# Patient Record
Sex: MALE | Race: WHITE
[De-identification: names, ages, dates, MRNs, and addresses within clinical notes are randomized per-mention and may not be internally consistent; named-entity substitution may affect disease eponyms.]

---

## 2017-06-20 ENCOUNTER — HOSPITAL ENCOUNTER (INPATIENT)
Dept: HOSPITAL 26 - MED | Age: 32
LOS: 2 days | Discharge: HOME | DRG: 433 | End: 2017-06-22
Attending: FAMILY MEDICINE | Admitting: FAMILY MEDICINE
Payer: MEDICAID

## 2017-06-20 VITALS — HEIGHT: 66 IN | BODY MASS INDEX: 29.58 KG/M2 | WEIGHT: 184.04 LBS

## 2017-06-20 VITALS — DIASTOLIC BLOOD PRESSURE: 61 MMHG | SYSTOLIC BLOOD PRESSURE: 129 MMHG

## 2017-06-20 DIAGNOSIS — E02: ICD-10-CM

## 2017-06-20 DIAGNOSIS — K92.0: ICD-10-CM

## 2017-06-20 DIAGNOSIS — D61.818: ICD-10-CM

## 2017-06-20 DIAGNOSIS — Z71.41: ICD-10-CM

## 2017-06-20 DIAGNOSIS — Z56.0: ICD-10-CM

## 2017-06-20 DIAGNOSIS — F10.10: ICD-10-CM
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
## 2017-06-20 DIAGNOSIS — F17.210: ICD-10-CM

## 2017-06-20 DIAGNOSIS — D62: ICD-10-CM

## 2017-06-20 DIAGNOSIS — K21.0: ICD-10-CM

## 2017-06-20 DIAGNOSIS — E44.0: ICD-10-CM

## 2017-06-20 DIAGNOSIS — K70.30: Primary | ICD-10-CM

## 2017-06-20 DIAGNOSIS — K22.10: ICD-10-CM

## 2017-06-20 DIAGNOSIS — D53.9: ICD-10-CM

## 2017-06-20 LAB
ALBUMIN FLD-MCNC: 2.9 G/DL (ref 3.4–5)
ALP SERPL-CCNC: 89 U/L (ref 46–116)
ALT SERPL-CCNC: 63 U/L (ref 12–78)
ANION GAP SERPL CALCULATED.3IONS-SCNC: 12.2 MMOL/L (ref 8–16)
APPEARANCE UR: CLEAR
AST SERPL-CCNC: 92 U/L (ref 15–37)
BILIRUB SERPL-MCNC: 0.5 MG/DL (ref 0–1)
BILIRUB UR QL STRIP: NEGATIVE
BUN SERPL-MCNC: 8 MG/DL (ref 7–18)
CALCIUM SPEC-MCNC: 7.6 MG/DL (ref 8.5–10.1)
CHLORIDE SERPL-SCNC: 108 MMOL/L (ref 98–107)
CO2 SERPL-SCNC: 25.3 MMOL/L (ref 21–32)
COLOR UR: YELLOW
CREAT SERPL-MCNC: 0.8 MG/DL (ref 0.6–1.3)
ERYTHROCYTE [DISTWIDTH] IN BLOOD BY AUTOMATED COUNT: 15.6 % (ref 11.6–13.7)
GFR SERPL CREATININE-BSD FRML MDRD: 143 ML/MIN (ref 90–?)
GLUCOSE SERPL-MCNC: 99 MG/DL (ref 74–106)
GLUCOSE UR STRIP-MCNC: NEGATIVE MG/DL
HCT VFR BLD AUTO: 28.5 % (ref 36–52)
HGB BLD-MCNC: 9.5 G/DL (ref 12–18)
HGB UR QL STRIP: NEGATIVE
HYPOCHROMIA BLD QL: (no result)
LEUKOCYTE ESTERASE UR QL STRIP: NEGATIVE
MACROCYTES BLD QL: (no result)
MCH RBC QN AUTO: 34 PG (ref 27–31)
MCHC RBC AUTO-ENTMCNC: 34 G/DL (ref 33–37)
MCV RBC AUTO: 101 FL (ref 80–94)
NEUTROPHILS % (MANUAL): 31 (ref 43–65)
NITRITE UR QL STRIP: NEGATIVE
PH UR STRIP: 6 [PH] (ref 5–9)
PLATELET # BLD AUTO: 71 K/UL (ref 140–450)
PLATELET BLD QL SMEAR: (no result)
POTASSIUM SERPL-SCNC: 3.5 MMOL/L (ref 3.5–5.1)
PROT SERPL-MCNC: 7 G/DL (ref 6.4–8.2)
PROT UR QL STRIP: NEGATIVE
RBC # BLD AUTO: 2.83 MIL/UL (ref 4.2–6.1)
SODIUM SERPL-SCNC: 142 MMOL/L (ref 136–145)
SP GR UR STRIP: <=1.005 (ref 1–1.03)
UROBILINOGEN UR STRIP-MCNC: 0.2 EU/DL (ref 0.2–1)
WBC # BLD AUTO: 6.4 K/UL (ref 4.8–10.8)

## 2017-06-20 PROCEDURE — C9113 INJ PANTOPRAZOLE SODIUM, VIA: HCPCS

## 2017-06-20 PROCEDURE — G0482 DRUG TEST DEF 15-21 CLASSES: HCPCS

## 2017-06-20 SDOH — ECONOMIC STABILITY - INCOME SECURITY: UNEMPLOYMENT, UNSPECIFIED: Z56.0

## 2017-06-20 NOTE — NUR
32Y/M PATIENT PRESENTS TO ED WITH C/O BLE SWEELING X 2 DAYS . PT STATES BLE 
PAIN AND SWELLING X 2 DAY. NO MEDICAL HX; SKIN IS PINK/WARM/DRY; AAOX4 WITH 
EVEN AND STEADY GAIT; LUNGS CLEAR BL; HR EVEN AND REGULAR; PT DENIES ANY FEVER, 
CP, SOB, OR COUGH AT THIS TIME; PATIENT STATES PAIN OF 0/10 AT THIS TIME; VSS; 
PATIENT POSITIONED FOR COMFORT; HOB ELEVATED; BEDRAILS UP X2; BED DOWN. ER MD 
MADE AWARE OF PT STATUS.

## 2017-06-21 VITALS — DIASTOLIC BLOOD PRESSURE: 70 MMHG | SYSTOLIC BLOOD PRESSURE: 131 MMHG

## 2017-06-21 VITALS — DIASTOLIC BLOOD PRESSURE: 74 MMHG | SYSTOLIC BLOOD PRESSURE: 125 MMHG

## 2017-06-21 VITALS — SYSTOLIC BLOOD PRESSURE: 125 MMHG | DIASTOLIC BLOOD PRESSURE: 71 MMHG

## 2017-06-21 VITALS — DIASTOLIC BLOOD PRESSURE: 73 MMHG | SYSTOLIC BLOOD PRESSURE: 125 MMHG

## 2017-06-21 VITALS — SYSTOLIC BLOOD PRESSURE: 130 MMHG | DIASTOLIC BLOOD PRESSURE: 64 MMHG

## 2017-06-21 VITALS — DIASTOLIC BLOOD PRESSURE: 75 MMHG | SYSTOLIC BLOOD PRESSURE: 123 MMHG

## 2017-06-21 LAB
AMYLASE SERPL-CCNC: 107 U/L (ref 25–115)
AMYLASE SERPL-CCNC: 124 U/L (ref 25–115)
APTT PPP: 33 SECS (ref 22–35.6)
CHOLEST SERPL-MCNC: 147 MG/DL (ref ?–200)
CHOLEST/HDLC SERPL: 14.7 {RATIO} (ref 1–4.5)
EOSINOPHIL NFR BLD MANUAL: 1 % (ref 0–4)
ERYTHROCYTE [DISTWIDTH] IN BLOOD BY AUTOMATED COUNT: 15.7 % (ref 11.6–13.7)
HCT VFR BLD AUTO: 24.5 % (ref 36–52)
HDLC SERPL-MCNC: 10 MG/DL (ref 40–60)
HGB BLD-MCNC: 8.2 G/DL (ref 12–18)
INR PPP: 1.5 (ref 0.8–1.2)
LDLC SERPL CALC-MCNC: 94 MG/DL (ref 60–100)
LIPASE SERPL-CCNC: 249 U/L (ref 73–393)
LIPASE SERPL-CCNC: 321 U/L (ref 73–393)
LYMPHOCYTES NFR BLD MANUAL: 59 % (ref 20–46)
LYMPHOCYTES NFR BLD MANUAL: 64 % (ref 20–46)
MACROCYTES BLD QL: (no result)
MAGNESIUM SERPL-MCNC: 1.9 MG/DL (ref 1.8–2.4)
MCH RBC QN AUTO: 34 PG (ref 27–31)
MCHC RBC AUTO-ENTMCNC: 33 G/DL (ref 33–37)
MCV RBC AUTO: 101 FL (ref 80–94)
MONOCYTES NFR BLD MANUAL: 4 % (ref 5–12)
MONOCYTES NFR BLD MANUAL: 7 % (ref 5–12)
NEUTROPHILS % (MANUAL): 29 (ref 43–65)
NEUTS BAND NFR BLD MANUAL: 4 % (ref 0–8)
PHOSPHATE SERPL-MCNC: 4.3 MG/DL (ref 2.5–4.9)
PLATELET # BLD AUTO: 51 K/UL (ref 140–450)
PLATELET BLD QL SMEAR: (no result)
PROTHROMBIN TIME: 15.6 SECS (ref 10.8–13.4)
RBC # BLD AUTO: 2.42 MIL/UL (ref 4.2–6.1)
T4 FREE SERPL-MCNC: 0.85 NG/DL (ref 0.76–1.46)
TRIGL SERPL-MCNC: 219 MG/DL (ref 30–150)
TSH SERPL DL<=0.05 MIU/L-ACNC: 5.97 UIU/ML (ref 0.34–3.76)
WBC # BLD AUTO: 4.3 K/UL (ref 4.8–10.8)

## 2017-06-21 PROCEDURE — 0DB68ZX EXCISION OF STOMACH, VIA NATURAL OR ARTIFICIAL OPENING ENDOSCOPIC, DIAGNOSTIC: ICD-10-PCS

## 2017-06-21 RX ADMIN — FERROUS SULFATE TAB 325 MG (65 MG ELEMENTAL FE) SCH MG: 325 (65 FE) TAB at 20:14

## 2017-06-21 RX ADMIN — MORPHINE SULFATE PRN MG: 2 INJECTION, SOLUTION INTRAMUSCULAR; INTRAVENOUS at 10:56

## 2017-06-21 RX ADMIN — PANTOPRAZOLE SODIUM SCH MG: 40 INJECTION, POWDER, FOR SOLUTION INTRAVENOUS at 20:24

## 2017-06-21 RX ADMIN — PANTOPRAZOLE SODIUM SCH MG: 40 INJECTION, POWDER, FOR SOLUTION INTRAVENOUS at 09:26

## 2017-06-21 RX ADMIN — LACTULOSE SCH GM: 10 SOLUTION ORAL at 20:17

## 2017-06-21 RX ADMIN — MORPHINE SULFATE PRN MG: 2 INJECTION, SOLUTION INTRAMUSCULAR; INTRAVENOUS at 20:16

## 2017-06-21 RX ADMIN — PANTOPRAZOLE SODIUM SCH MG: 40 INJECTION, POWDER, FOR SOLUTION INTRAVENOUS at 20:15

## 2017-06-21 NOTE — NUR
[id number]. NOTED PT. UNDERSTANDS ENGLISH . SPEAKS Mongolian WITH WIFE.  
WIFE SPEAKS Mongolian AND ENGLISH.

## 2017-06-21 NOTE — NUR
RECEIVED FROM ER PER KENYA AWAKE AND ALERT. NO SOB. DX. OF LIVER CIRRHOSIS AND GI BLEED. 
VERBALIZES WELL IN Turkmen. ACCOMPANIED BY FAMILY MEMBER. IVF SITE TO [facility]. WITH BILATERAL 
NON-PITTING EDEMA. CALL LIGHT USE EXPLAINED. CARE PLANS FOR THE NIGHT EXPLAINED TO FAMILY 
MEMBER. WITH LEFT UPPER QUADRANT TENDERNESS DURING PALPATION.

## 2017-06-21 NOTE — NUR
RECEIVED REPORT FROM NIGHT NURSE, PT IS AAOX4 [place] SPEAKING, ON ROOM AIR, SKIN INTACT, 
WITH NON-PITTING EDEMA TO LOWER EXTREMITIES. IV TO RIGHT AC 20G SALINE LOCK PATENT AND 
INTACT, LEFT FA 18G SALINE LOCK PATENT AND INTACT. INITIAL ASSESSMENT COMPLETED, REVIEW PLAN 
OF CARE WITH PT PT VERBALIZED UNDERSTANDING, ALL SAFETY PRECAUTION MET. ALL NEEDS MET. CALL 
LIGHT WITHIN REACH. WILL CONTINUE TO MONITOR.

## 2017-06-21 NOTE — NUR
RECEIVED REPORT FROM DAY RN FOR CONTINUITY OF CARE. PATIENT IS A&OX4, DISCUSSED PLAN OF CARE 
WITH PATIENT, VERBALIZED UNDERSTANDING. SHIFT ASSESSMENT DONE, VS TAKEN, STABLE AT THIS 
TIME. NO S/S OF RESPIRATORY DISTRESS OR DISCOMFORT NOTED ON ROOM AIR. PATIENT STATES 
ABDOMINAL PAIN, WILL MEDICATE PER MD ORDER. IV TO RT AC AND LT FA PATENT AND FLUSHED. SAFETY 
PRECAUTIONS ENFORCED. CALL LIGHT WITHIN REACH. WILL CONTINUE TO MONITOR.

## 2017-06-21 NOTE — NUR
DUE MEDICATIONS ADMINISTERED, TOLERATED WELL AND VERBALIZED UNDERSTANDING OF USE. CALL LIGHT 
WITHIN REACH. WILL CONTINUE TO MONITOR.

## 2017-06-21 NOTE — NUR
PATIENT HAS BEEN SCREENED AND CATEGORIZED AS MODERATE NUTRITION RISK. PATIENT WILL BE SEEN 
WITHIN 3-5 DAYS OF ADMISSION.



6/23/17-6/25/17



JAYCEE VEGA RD

## 2017-06-21 NOTE — NUR
PT. SLEPT WELL THIS SHIFT. REFUSED TO HAVE SEQUENTIALS IN PLACE. PROS AND CONS EXPLAINED.  
NO COMPLAINTS OF PAIN DONE THIS SHIFT. TELEMETRY MONITORING.

## 2017-06-21 NOTE — NUR
Patient will be admitted to care of DR. GONZALES. Admited to TELEMETRY.  Will go 
to room 111B. Belongings list completed.  Report to MATT ESPINO.

## 2017-06-21 NOTE — NUR
PT CURRENTLY SITTING ON CHAIR HAVING DINNER. NO S/S OF DISTRESS NOTED. ALL NEEDS MET, WIFE 
AT BEDSIDE. WILL CONTINUE TO MONITOR.

## 2017-06-21 NOTE — NUR
DUE MEDICATION GIVEN. ALL NEEDS MET. CALL LIGHT WITHIN REACH. WIFE AT BEDSIDE. WILL CONTINUE 
TO MONITOR.

## 2017-06-22 VITALS — SYSTOLIC BLOOD PRESSURE: 119 MMHG | DIASTOLIC BLOOD PRESSURE: 71 MMHG

## 2017-06-22 VITALS — SYSTOLIC BLOOD PRESSURE: 130 MMHG | DIASTOLIC BLOOD PRESSURE: 78 MMHG

## 2017-06-22 VITALS — DIASTOLIC BLOOD PRESSURE: 72 MMHG | SYSTOLIC BLOOD PRESSURE: 118 MMHG

## 2017-06-22 VITALS — DIASTOLIC BLOOD PRESSURE: 69 MMHG | SYSTOLIC BLOOD PRESSURE: 113 MMHG

## 2017-06-22 LAB
ANION GAP SERPL CALCULATED.3IONS-SCNC: 10.6 MMOL/L (ref 8–16)
BUN SERPL-MCNC: 9 MG/DL (ref 7–18)
CALCIUM SPEC-MCNC: 7.8 MG/DL (ref 8.5–10.1)
CHLORIDE SERPL-SCNC: 106 MMOL/L (ref 98–107)
CO2 SERPL-SCNC: 26.1 MMOL/L (ref 21–32)
CREAT SERPL-MCNC: 0.7 MG/DL (ref 0.6–1.3)
EOSINOPHIL NFR BLD MANUAL: 1 % (ref 0–4)
ERYTHROCYTE [DISTWIDTH] IN BLOOD BY AUTOMATED COUNT: 15 % (ref 11.6–13.7)
GFR SERPL CREATININE-BSD FRML MDRD: 167 ML/MIN (ref 90–?)
GLUCOSE SERPL-MCNC: 89 MG/DL (ref 74–106)
HCT VFR BLD AUTO: 26.3 % (ref 36–52)
HCV AB S/CO SERPL IA: <0.1 S/CO RATIO (ref 0–0.9)
HGB BLD-MCNC: 8.8 G/DL (ref 12–18)
LYMPHOCYTES NFR BLD MANUAL: 56 % (ref 20–46)
MAGNESIUM SERPL-MCNC: 1.8 MG/DL (ref 1.8–2.4)
MCH RBC QN AUTO: 34 PG (ref 27–31)
MCHC RBC AUTO-ENTMCNC: 34 G/DL (ref 33–37)
MCV RBC AUTO: 100 FL (ref 80–94)
MONOCYTES NFR BLD MANUAL: 5 % (ref 5–12)
NEUTROPHILS % (MANUAL): 38 (ref 43–65)
PHOSPHATE SERPL-MCNC: 3.4 MG/DL (ref 2.5–4.9)
PLATELET # BLD AUTO: 57 K/UL (ref 140–450)
POTASSIUM SERPL-SCNC: 3.7 MMOL/L (ref 3.5–5.1)
RBC # BLD AUTO: 2.62 MIL/UL (ref 4.2–6.1)
SODIUM SERPL-SCNC: 139 MMOL/L (ref 136–145)
T3RU NFR SERPL: 35 % (ref 24–39)
T4 SERPL-MCNC: 4.3 UG/DL (ref 4.5–12)
WBC # BLD AUTO: 3.4 K/UL (ref 4.8–10.8)

## 2017-06-22 RX ADMIN — FERROUS SULFATE TAB 325 MG (65 MG ELEMENTAL FE) SCH MG: 325 (65 FE) TAB at 12:19

## 2017-06-22 RX ADMIN — PANTOPRAZOLE SODIUM SCH MG: 40 INJECTION, POWDER, FOR SOLUTION INTRAVENOUS at 09:08

## 2017-06-22 RX ADMIN — LACTULOSE SCH GM: 10 SOLUTION ORAL at 09:07

## 2017-06-22 RX ADMIN — FERROUS SULFATE TAB 325 MG (65 MG ELEMENTAL FE) SCH MG: 325 (65 FE) TAB at 09:09

## 2017-06-22 NOTE — NUR
VITAL SIGNS STABLE. PATIENT RESTING IN BED, NO S/S OF DISTRESS OR DISCOMFORT NOTED. CALL 
LIGHT WITHIN REACH.

## 2017-06-22 NOTE — NUR
PATIENT SIGNED ALL DISCHARGE PAPERWORK, DISCHARGE EDUCATION GIVEN, FOLLOW UP INFORMATION 
GIVEN, PRESCRIPTION GIVEN, PT VERBALIZED UNDERSTANDING. ALL PERSONAL BELONGING WITH PT. WIFE 
IN TO  PT. PT WAS WALKED TO FRONT LOBBY IN STABLE CONDITION.

## 2017-06-22 NOTE — NUR
VITAL SIGNS STABLE AT THIS TIME, PATIENT DENIES PAIN. CALL LIGHT WITHIN REACH, WILL CONTINUE 
TO MONITOR.

## 2017-06-22 NOTE — NUR
DUE  MEDICATION GIVEN, PT TOLERATED WELL. ALL NEEDS MET. FAMILY AT BEDSIDE. ALL NEEDS MET. 
CALL LIGHT WITHIN REACH. WILL CONTINUE TO MONITOR.

## 2017-06-22 NOTE — NUR
DUE MEDICATIONS GIVEN. PT CURRENTLY EATING LUNCH, FATHER AT BEDSIDE. ALL NEEDS MET. CALL 
LIGHT WITHIN REACH. WILL CONTINUE TO MONITOR.

## 2017-06-22 NOTE — NUR
RECEIVED REPORT FROM NIGHT NURSE, PT IS AAOX4 Turkish SPEAKING, ON ROOM AIR, SKIN INTACT, 
WITH NON-PITTING EDEMA TO LOWER EXTREMITIES. IV TO RIGHT AC 20G SALINE LOCK PATENT AND 
INTACT, LEFT FA 22G SALINE LOCK PATENT AND INTACT. INITIAL ASSESSMENT COMPLETED, REVIEW PLAN 
OF CARE WITH PT PT VERBALIZED UNDERSTANDING, ALL SAFETY PRECAUTION MET. ALL NEEDS MET. CALL 
LIGHT WITHIN REACH. WILL CONTINUE TO MONITOR.

## 2017-06-22 NOTE — NUR
PATIENT ASLEEP AT THIS TIME. NO S/S OF DISTRESS OR DISCOMFORT NOTED. CALL LIGHT WITHIN 
REACH, WILL CONTINUE TO MONITOR.

## 2017-06-23 LAB — HBA1C MFR BLD: 4.8 % (ref 4.8–5.6)

## 2017-09-27 ENCOUNTER — HOSPITAL ENCOUNTER (EMERGENCY)
Dept: HOSPITAL 26 - MED | Age: 32
Discharge: HOME | End: 2017-09-27
Payer: MEDICAID

## 2017-09-27 VITALS — SYSTOLIC BLOOD PRESSURE: 139 MMHG | DIASTOLIC BLOOD PRESSURE: 75 MMHG

## 2017-09-27 VITALS — SYSTOLIC BLOOD PRESSURE: 122 MMHG | DIASTOLIC BLOOD PRESSURE: 70 MMHG

## 2017-09-27 VITALS — WEIGHT: 185 LBS | HEIGHT: 67 IN | BODY MASS INDEX: 29.03 KG/M2

## 2017-09-27 DIAGNOSIS — G92: Primary | ICD-10-CM

## 2017-09-27 DIAGNOSIS — Z79.899: ICD-10-CM

## 2017-09-27 DIAGNOSIS — F10.229: ICD-10-CM

## 2017-09-27 LAB
ALBUMIN FLD-MCNC: 3.6 G/DL (ref 3.4–5)
ANION GAP SERPL CALCULATED.3IONS-SCNC: 13.9 MMOL/L (ref 8–16)
AST SERPL-CCNC: 94 U/L (ref 15–37)
BASOPHILS # BLD AUTO: 0.5 K/UL (ref 0–0.22)
BASOPHILS NFR BLD AUTO: 0 % (ref 0–2)
BILIRUB DIRECT SERPL-MCNC: 0.2 MG/DL (ref 0–0.3)
BILIRUB SERPL-MCNC: 0.6 MG/DL (ref 0–1)
BUN SERPL-MCNC: 11 MG/DL (ref 7–18)
CHLORIDE SERPL-SCNC: 107 MMOL/L (ref 98–107)
CO2 SERPL-SCNC: 22.8 MMOL/L (ref 21–32)
CREAT SERPL-MCNC: 0.7 MG/DL (ref 0.7–1.3)
EOSINOPHIL # BLD AUTO: 0.1 K/UL (ref 0–0.4)
EOSINOPHIL NFR BLD AUTO: 1 % (ref 0–4)
ERYTHROCYTE [DISTWIDTH] IN BLOOD BY AUTOMATED COUNT: 17.9 % (ref 11.6–13.7)
GFR SERPL CREATININE-BSD FRML MDRD: 167 ML/MIN (ref 90–?)
GLUCOSE SERPL-MCNC: 105 MG/DL (ref 74–106)
HCT VFR BLD AUTO: 38 % (ref 36–52)
HGB BLD-MCNC: 12.7 G/DL (ref 12–18)
LIPASE SERPL-CCNC: 253 U/L (ref 73–393)
LYMPHOCYTES # BLD AUTO: 2.8 K/UL (ref 2–11.5)
LYMPHOCYTES NFR BLD AUTO: 48 % (ref 20.5–51.1)
MCH RBC QN AUTO: 25 PG (ref 27–31)
MCHC RBC AUTO-ENTMCNC: 33 G/DL (ref 33–37)
MCV RBC AUTO: 76 FL (ref 80–94)
MONOCYTES # BLD AUTO: 0.3 K/UL (ref 0.8–1)
MONOCYTES NFR BLD AUTO: 5 % (ref 1.7–9.3)
NEUTROPHILS # BLD AUTO: 2.3 K/UL (ref 1.8–7.7)
NEUTROPHILS NFR BLD AUTO: 46 % (ref 42.2–75.2)
PLATELET # BLD AUTO: 74 K/UL (ref 140–450)
POTASSIUM SERPL-SCNC: 3.7 MMOL/L (ref 3.5–5.1)
PROTHROMBIN TIME: 13.6 SECS (ref 10.8–13.4)
RBC # BLD AUTO: 4.99 MIL/UL (ref 4.2–6.1)
SODIUM SERPL-SCNC: 140 MMOL/L (ref 136–145)
WBC # BLD AUTO: 6 K/UL (ref 4.8–10.8)

## 2017-09-27 PROCEDURE — 36415 COLL VENOUS BLD VENIPUNCTURE: CPT

## 2017-09-27 PROCEDURE — A9153 MULTI-VITAMIN NOS: HCPCS

## 2017-09-27 PROCEDURE — 96375 TX/PRO/DX INJ NEW DRUG ADDON: CPT

## 2017-09-27 PROCEDURE — 80053 COMPREHEN METABOLIC PANEL: CPT

## 2017-09-27 PROCEDURE — 96365 THER/PROPH/DIAG IV INF INIT: CPT

## 2017-09-27 PROCEDURE — 85025 COMPLETE CBC W/AUTO DIFF WBC: CPT

## 2017-09-27 PROCEDURE — 85730 THROMBOPLASTIN TIME PARTIAL: CPT

## 2017-09-27 PROCEDURE — 83690 ASSAY OF LIPASE: CPT

## 2017-09-27 PROCEDURE — 82248 BILIRUBIN DIRECT: CPT

## 2017-09-27 PROCEDURE — 85610 PROTHROMBIN TIME: CPT

## 2017-09-27 PROCEDURE — 99284 EMERGENCY DEPT VISIT MOD MDM: CPT

## 2017-09-27 NOTE — NUR
Patient discharged with v/s stable. Written and verbal after care instructions 
given and explained. Patient alert, oriented and verbalized understanding of 
instructions. Ambulatory with steady gait. All questions addressed prior to 
discharge. ID band removed. Patient advised to follow up with PMD. Rx of ATIVAN 
1 MG & NORCO 5MG-325MG given. Patient educated on indication of medication 
including possible reaction and side effects. Opportunity to ask questions 
provided and answered.

## 2018-11-02 ENCOUNTER — HOSPITAL ENCOUNTER (EMERGENCY)
Dept: HOSPITAL 26 - MED | Age: 33
Discharge: HOME | End: 2018-11-02
Payer: MEDICAID

## 2018-11-02 VITALS — DIASTOLIC BLOOD PRESSURE: 79 MMHG | SYSTOLIC BLOOD PRESSURE: 125 MMHG

## 2018-11-02 VITALS — DIASTOLIC BLOOD PRESSURE: 80 MMHG | SYSTOLIC BLOOD PRESSURE: 130 MMHG

## 2018-11-02 VITALS — HEIGHT: 66 IN | WEIGHT: 185 LBS | BODY MASS INDEX: 29.73 KG/M2

## 2018-11-02 DIAGNOSIS — Z88.8: ICD-10-CM

## 2018-11-02 DIAGNOSIS — K52.9: Primary | ICD-10-CM

## 2018-11-02 DIAGNOSIS — K74.60: ICD-10-CM

## 2018-11-02 DIAGNOSIS — Z79.899: ICD-10-CM

## 2018-11-02 LAB
ALBUMIN FLD-MCNC: 4 G/DL (ref 3.4–5)
ANION GAP SERPL CALCULATED.3IONS-SCNC: 15.2 MMOL/L (ref 8–16)
AST SERPL-CCNC: 99 U/L (ref 15–37)
BASOPHILS # BLD AUTO: 0 K/UL (ref 0–0.22)
BASOPHILS NFR BLD AUTO: 0.4 % (ref 0–2)
BILIRUB SERPL-MCNC: 0.3 MG/DL (ref 0–1)
BUN SERPL-MCNC: 9 MG/DL (ref 7–18)
CHLORIDE SERPL-SCNC: 105 MMOL/L (ref 98–107)
CO2 SERPL-SCNC: 26.5 MMOL/L (ref 21–32)
CREAT SERPL-MCNC: 0.9 MG/DL (ref 0.7–1.3)
EOSINOPHIL # BLD AUTO: 0 K/UL (ref 0–0.4)
EOSINOPHIL NFR BLD AUTO: 0.6 % (ref 0–4)
ERYTHROCYTE [DISTWIDTH] IN BLOOD BY AUTOMATED COUNT: 18.1 % (ref 11.6–13.7)
GFR SERPL CREATININE-BSD FRML MDRD: 124 ML/MIN (ref 90–?)
GLUCOSE SERPL-MCNC: 95 MG/DL (ref 74–106)
HCT VFR BLD AUTO: 44.9 % (ref 36–52)
HGB BLD-MCNC: 14.8 G/DL (ref 12–18)
LYMPHOCYTES # BLD AUTO: 3.3 K/UL (ref 2–11.5)
LYMPHOCYTES NFR BLD AUTO: 61.6 % (ref 20.5–51.1)
MCH RBC QN AUTO: 28 PG (ref 27–31)
MCHC RBC AUTO-ENTMCNC: 33 G/DL (ref 33–37)
MCV RBC AUTO: 86.1 FL (ref 80–94)
MONOCYTES # BLD AUTO: 0.4 K/UL (ref 0.8–1)
MONOCYTES NFR BLD AUTO: 6.9 % (ref 1.7–9.3)
NEUTROPHILS # BLD AUTO: 1.7 K/UL (ref 1.8–7.7)
NEUTROPHILS NFR BLD AUTO: 30.5 % (ref 42.2–75.2)
PLATELET # BLD AUTO: 83 K/UL (ref 140–450)
POTASSIUM SERPL-SCNC: 3.7 MMOL/L (ref 3.5–5.1)
RBC # BLD AUTO: 5.21 MIL/UL (ref 4.2–6.1)
SODIUM SERPL-SCNC: 143 MMOL/L (ref 136–145)
WBC # BLD AUTO: 5.4 K/UL (ref 4.8–10.8)

## 2018-11-02 PROCEDURE — 80053 COMPREHEN METABOLIC PANEL: CPT

## 2018-11-02 PROCEDURE — 96361 HYDRATE IV INFUSION ADD-ON: CPT

## 2018-11-02 PROCEDURE — 85025 COMPLETE CBC W/AUTO DIFF WBC: CPT

## 2018-11-02 PROCEDURE — 99285 EMERGENCY DEPT VISIT HI MDM: CPT

## 2018-11-02 PROCEDURE — 96374 THER/PROPH/DIAG INJ IV PUSH: CPT

## 2018-11-02 PROCEDURE — 36415 COLL VENOUS BLD VENIPUNCTURE: CPT

## 2018-11-02 PROCEDURE — 74022 RADEX COMPL AQT ABD SERIES: CPT

## 2018-12-09 ENCOUNTER — HOSPITAL ENCOUNTER (EMERGENCY)
Dept: HOSPITAL 26 - MED | Age: 33
Discharge: HOME | End: 2018-12-09
Payer: MEDICAID

## 2018-12-09 VITALS — DIASTOLIC BLOOD PRESSURE: 83 MMHG | SYSTOLIC BLOOD PRESSURE: 120 MMHG

## 2018-12-09 VITALS — WEIGHT: 185 LBS | BODY MASS INDEX: 29.03 KG/M2 | HEIGHT: 67 IN

## 2018-12-09 VITALS — DIASTOLIC BLOOD PRESSURE: 80 MMHG | SYSTOLIC BLOOD PRESSURE: 119 MMHG

## 2018-12-09 DIAGNOSIS — R10.9: Primary | ICD-10-CM

## 2018-12-09 DIAGNOSIS — Z88.6: ICD-10-CM

## 2018-12-09 DIAGNOSIS — R11.2: ICD-10-CM

## 2018-12-09 DIAGNOSIS — Z79.899: ICD-10-CM

## 2018-12-09 LAB
ALBUMIN FLD-MCNC: 3.8 G/DL (ref 3.4–5)
ANION GAP SERPL CALCULATED.3IONS-SCNC: 12.7 MMOL/L (ref 8–16)
AST SERPL-CCNC: 104 U/L (ref 15–37)
BASOPHILS # BLD AUTO: 0 K/UL (ref 0–0.22)
BASOPHILS NFR BLD AUTO: 0.6 % (ref 0–2)
BILIRUB SERPL-MCNC: 0.3 MG/DL (ref 0–1)
BUN SERPL-MCNC: 10 MG/DL (ref 7–18)
CHLORIDE SERPL-SCNC: 106 MMOL/L (ref 98–107)
CO2 SERPL-SCNC: 27.3 MMOL/L (ref 21–32)
CREAT SERPL-MCNC: 0.9 MG/DL (ref 0.7–1.3)
EOSINOPHIL # BLD AUTO: 0.1 K/UL (ref 0–0.4)
EOSINOPHIL NFR BLD AUTO: 1.1 % (ref 0–4)
ERYTHROCYTE [DISTWIDTH] IN BLOOD BY AUTOMATED COUNT: 19.2 % (ref 11.6–13.7)
GFR SERPL CREATININE-BSD FRML MDRD: 124 ML/MIN (ref 90–?)
GLUCOSE SERPL-MCNC: 110 MG/DL (ref 74–106)
HCT VFR BLD AUTO: 46.5 % (ref 36–52)
HGB BLD-MCNC: 15.5 G/DL (ref 12–18)
LIPASE SERPL-CCNC: 142 U/L (ref 73–393)
LYMPHOCYTES # BLD AUTO: 4.1 K/UL (ref 2–11.5)
LYMPHOCYTES NFR BLD AUTO: 55 % (ref 20.5–51.1)
MCH RBC QN AUTO: 29 PG (ref 27–31)
MCHC RBC AUTO-ENTMCNC: 33 G/DL (ref 33–37)
MCV RBC AUTO: 86.6 FL (ref 80–94)
MONOCYTES # BLD AUTO: 0.7 K/UL (ref 0.8–1)
MONOCYTES NFR BLD AUTO: 9.4 % (ref 1.7–9.3)
NEUTROPHILS # BLD AUTO: 2.5 K/UL (ref 1.8–7.7)
NEUTROPHILS NFR BLD AUTO: 33.9 % (ref 42.2–75.2)
PLATELET # BLD AUTO: 111 K/UL (ref 140–450)
POTASSIUM SERPL-SCNC: 4 MMOL/L (ref 3.5–5.1)
RBC # BLD AUTO: 5.37 MIL/UL (ref 4.2–6.1)
SODIUM SERPL-SCNC: 142 MMOL/L (ref 136–145)
WBC # BLD AUTO: 7.5 K/UL (ref 4.8–10.8)

## 2018-12-09 PROCEDURE — 96374 THER/PROPH/DIAG INJ IV PUSH: CPT

## 2018-12-09 PROCEDURE — 81002 URINALYSIS NONAUTO W/O SCOPE: CPT

## 2018-12-09 PROCEDURE — 83690 ASSAY OF LIPASE: CPT

## 2018-12-09 PROCEDURE — 80053 COMPREHEN METABOLIC PANEL: CPT

## 2018-12-09 PROCEDURE — 36415 COLL VENOUS BLD VENIPUNCTURE: CPT

## 2018-12-09 PROCEDURE — 74176 CT ABD & PELVIS W/O CONTRAST: CPT

## 2018-12-09 PROCEDURE — 96375 TX/PRO/DX INJ NEW DRUG ADDON: CPT

## 2018-12-09 PROCEDURE — 99284 EMERGENCY DEPT VISIT MOD MDM: CPT

## 2018-12-09 PROCEDURE — 85025 COMPLETE CBC W/AUTO DIFF WBC: CPT

## 2018-12-09 NOTE — NUR
Patient discharged with v/s stable. Written and verbal after care instructions 
given and explained. Patient alert, oriented and verbalized understanding of 
instructions. Ambulatory with steady gait. All questions addressed prior to 
discharge. ID band removed. Patient advised to follow up with PMD. Rx of zofran 
and tramadol given. Patient educated on indication of medication including 
possible reaction and side effects. Opportunity to ask questions provided and 
answered.

## 2018-12-09 NOTE — NUR
33Y/M BIB WIFE WITH C/O SORIANO, BL FLANK PAIN WITH NAUSEA X 2 DAYS WORSE TODAY; 
DENIES V/D. PT IS AAOX4, VSS AT THIS TIME, BED DOWN, BEDRAIL UP X 1 , ER MD 
AWARE AND NOTIFIED OF PT STATUS.



HX; DENIES

RX; DENIES

## 2019-01-28 ENCOUNTER — HOSPITAL ENCOUNTER (EMERGENCY)
Dept: HOSPITAL 26 - MED | Age: 34
LOS: 1 days | Discharge: HOME | End: 2019-01-29
Payer: MEDICAID

## 2019-01-28 VITALS — BODY MASS INDEX: 29.82 KG/M2 | HEIGHT: 67 IN | WEIGHT: 190 LBS

## 2019-01-28 VITALS — SYSTOLIC BLOOD PRESSURE: 131 MMHG | DIASTOLIC BLOOD PRESSURE: 87 MMHG
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
## 2019-01-28 DIAGNOSIS — S42.192A: Primary | ICD-10-CM
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
## 2019-01-28 DIAGNOSIS — Z79.899: ICD-10-CM

## 2019-01-28 DIAGNOSIS — Z88.5: ICD-10-CM

## 2019-01-28 PROCEDURE — 96372 THER/PROPH/DIAG INJ SC/IM: CPT

## 2019-01-28 PROCEDURE — 99283 EMERGENCY DEPT VISIT LOW MDM: CPT

## 2019-01-28 PROCEDURE — 73030 X-RAY EXAM OF SHOULDER: CPT

## 2019-01-28 NOTE — NUR
33/M PRESENTS TO ED WITH FAMILY/FRIEND, C/O L SHOULDER PAIN, X3 DAYS. PT STATED 
THAT HE WAS BINGE DRINKING 3 DAYS AGO AND HAD A FALL "INTO THE DRESSER." 
REPORTS TAKING TRAMADOL AND TYLENOL WITH NO RELIEF. L SHOULDER SKIN INTACT, 
MILD BRUISING NOTED ON ANTERIOR AND POSTERIOR SHOULDER, MILD SWELLING, UNABLE 
TO ABDUCT ARM FULLY DUE TO PAIN. +CIRCULATION, +SENSATION. 

DENIES MED HX OR RX.

## 2019-01-29 ENCOUNTER — HOSPITAL ENCOUNTER (EMERGENCY)
Dept: HOSPITAL 26 - MED | Age: 34
Discharge: HOME | End: 2019-01-29
Payer: MEDICAID

## 2019-01-29 VITALS — SYSTOLIC BLOOD PRESSURE: 126 MMHG | DIASTOLIC BLOOD PRESSURE: 82 MMHG

## 2019-01-29 VITALS — SYSTOLIC BLOOD PRESSURE: 118 MMHG | DIASTOLIC BLOOD PRESSURE: 72 MMHG

## 2019-01-29 VITALS — WEIGHT: 180 LBS | HEIGHT: 65 IN | BODY MASS INDEX: 29.99 KG/M2

## 2019-01-29 VITALS — DIASTOLIC BLOOD PRESSURE: 87 MMHG | SYSTOLIC BLOOD PRESSURE: 131 MMHG

## 2019-01-29 DIAGNOSIS — J11.1: Primary | ICD-10-CM

## 2019-01-29 PROCEDURE — 71045 X-RAY EXAM CHEST 1 VIEW: CPT

## 2019-01-29 PROCEDURE — 73010 X-RAY EXAM OF SHOULDER BLADE: CPT

## 2019-01-29 PROCEDURE — 99283 EMERGENCY DEPT VISIT LOW MDM: CPT

## 2019-01-29 PROCEDURE — 96372 THER/PROPH/DIAG INJ SC/IM: CPT

## 2019-01-29 NOTE — NUR
Patient discharged with v/s stable. Written and verbal after care instructions 
given and explained. 

Patient alert, oriented and verbalized understanding of instructions. 
Ambulatory with steady gait. All questions addressed prior to discharge. ID 
band removed. Patient advised to follow up with PMD. Rx of NORCO & NAPROSYN 
given. Patient educated on indication of medication including possible reaction 
and side effects. Opportunity to ask questions provided and answered.

## 2019-01-29 NOTE — NUR
Patient discharged with v/s stable. Written and verbal after care instructions 
given and explained. 

Patient alert, oriented and verbalized understanding of instructions. 
Ambulatory with steady gait. All questions addressed prior to discharge. ID 
band removed. Patient advised to follow up with PMD. Rx of TRAMADOL, MOTRIN 
given. Patient educated on indication of medication including possible reaction 
and side effects. Opportunity to ask questions provided and answered.

## 2019-01-29 NOTE — NUR
-------------------------------------------------------------------------------

            *** Note pham in EDM - 01/29/19 at 1624 by [facility] ***            

-------------------------------------------------------------------------------

Patient discharged with v/s stable. Written and verbal after care instructions 
given and explained to parent/guardian. Parent/Guardian verbalized 
understanding. Ambulatorysteady gait. All questions addressed prior to 
discharge. Advised to follow up with PMD.

## 2019-01-29 NOTE — NUR
33/M BIB WIFE C/O SEVERE PAIN TO LEFT SHOULDER--

SEEN YESTERDAY AND DC HOME  DX NON DISPLACED FX LEFT SCAPULA 

                                                  RX MOTRIN / TRAMADOL

S/P FALL YESTERDAY AT HOME WHILE INTOXICATED



RETURN BECAUSE PAIN IS SEVERE



HX--DENIES

RX--NONE

## 2019-02-07 ENCOUNTER — HOSPITAL ENCOUNTER (EMERGENCY)
Dept: HOSPITAL 26 - MED | Age: 34
LOS: 1 days | Discharge: HOME | End: 2019-02-08
Payer: MEDICAID

## 2019-02-07 VITALS — DIASTOLIC BLOOD PRESSURE: 73 MMHG | SYSTOLIC BLOOD PRESSURE: 115 MMHG

## 2019-02-07 VITALS — WEIGHT: 190 LBS | BODY MASS INDEX: 28.79 KG/M2 | HEIGHT: 68 IN
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
## 2019-02-07 DIAGNOSIS — F17.200: ICD-10-CM

## 2019-02-07 DIAGNOSIS — Z88.5: ICD-10-CM

## 2019-02-07 DIAGNOSIS — Z79.899: ICD-10-CM

## 2019-02-07 DIAGNOSIS — M54.9: ICD-10-CM

## 2019-02-07 DIAGNOSIS — S42.102D: Primary | ICD-10-CM

## 2019-02-07 PROCEDURE — 99283 EMERGENCY DEPT VISIT LOW MDM: CPT

## 2019-02-07 PROCEDURE — 96372 THER/PROPH/DIAG INJ SC/IM: CPT

## 2019-02-07 NOTE — NUR
33/M PRESENTS TO ED WITH WIFE, C/O L SHOULDER PAIN, X2 WEEKS. PT WAS SEEN FOR 
SAME COMPLAINT 2 WEEKS AGO, DX SCAPULA FX S/P FALL INTO DRESSER, HAS NOT BEEN 
ABLE TO FOLLOW UP WITH ORTHO MD DUE TO "ONLY HAVING EMERGENCY INSURANCE." L 
SHOULDER SKIN INTACT, MILD BRUISING NOTED ON ANTERIOR AND POSTERIOR SHOULDER, 
MILD SWELLING, UNABLE TO FULLY ABDUCT ARM DUE TO PAIN. +CIRCULATION, 
+SENSATION.

## 2019-02-08 VITALS — DIASTOLIC BLOOD PRESSURE: 75 MMHG | SYSTOLIC BLOOD PRESSURE: 128 MMHG

## 2019-02-08 NOTE — NUR
Patient discharged with v/s stable. Written and verbal after care instructions 
given and explained. 

Patient alert, oriented and verbalized understanding of instructions. 
Ambulatory with steady gait. All questions addressed prior to discharge. ID 
band removed. Patient advised to follow up with PMD. Rx of NORCO 5MG-325MG AND 
MOTRIN 800MG given. Patient educated on indication of medication including 
possible reaction and side effects. Opportunity to ask questions provided and 
answered.

## 2019-03-07 ENCOUNTER — HOSPITAL ENCOUNTER (EMERGENCY)
Dept: HOSPITAL 26 - MED | Age: 34
Discharge: HOME | End: 2019-03-07
Payer: MEDICAID

## 2019-03-07 VITALS — SYSTOLIC BLOOD PRESSURE: 143 MMHG | DIASTOLIC BLOOD PRESSURE: 82 MMHG

## 2019-03-07 VITALS — DIASTOLIC BLOOD PRESSURE: 79 MMHG | SYSTOLIC BLOOD PRESSURE: 135 MMHG

## 2019-03-07 VITALS — BODY MASS INDEX: 31.34 KG/M2 | WEIGHT: 195 LBS | HEIGHT: 66 IN
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
## 2019-03-07 DIAGNOSIS — S42.102A: Primary | ICD-10-CM

## 2019-03-07 DIAGNOSIS — Z88.6: ICD-10-CM

## 2019-03-07 NOTE — NUR
33 Y MALE BIB WIFE C/O SHOULDER PAIN S/P FALL AND FRACTURE 40 DAYS AGO. PT WAS 
SENT HOME ON A SLING WITH PAIN MEDICATIONS NORCO AND MOTRIN. -EDEMA, -REDNESS, 
-SWELLING, +ROM, CAP REFILL < 3 SECONDS. PAIN 8/10 ACHING, COMES AND GOES WITH 
MOVEMENT AND WEATHER CHANGES.

PT STATES HE WANTS WANTS A RELEASE TO GO BACK TO WORK.



PT IN BED, BED RAIL X 1, BED IS DOWN, LOCKED, ERMD NOTIFIED OF PATIENT STATUS



RX: MOTRIN, NORCO

HX: DENIES

## 2019-06-10 ENCOUNTER — HOSPITAL ENCOUNTER (INPATIENT)
Dept: HOSPITAL 26 - MED | Age: 34
LOS: 4 days | Discharge: HOME | DRG: 243 | End: 2019-06-14
Attending: GENERAL PRACTICE | Admitting: GENERAL PRACTICE
Payer: MEDICAID

## 2019-06-10 VITALS — DIASTOLIC BLOOD PRESSURE: 82 MMHG | SYSTOLIC BLOOD PRESSURE: 143 MMHG

## 2019-06-10 VITALS — BODY MASS INDEX: 29.57 KG/M2 | WEIGHT: 184 LBS | HEIGHT: 66 IN

## 2019-06-10 VITALS — DIASTOLIC BLOOD PRESSURE: 65 MMHG | SYSTOLIC BLOOD PRESSURE: 111 MMHG

## 2019-06-10 DIAGNOSIS — F10.129: ICD-10-CM

## 2019-06-10 DIAGNOSIS — Z79.899: ICD-10-CM

## 2019-06-10 DIAGNOSIS — E44.0: ICD-10-CM

## 2019-06-10 DIAGNOSIS — K44.9: ICD-10-CM

## 2019-06-10 DIAGNOSIS — K25.4: ICD-10-CM
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
## 2019-06-10 DIAGNOSIS — K22.11: ICD-10-CM

## 2019-06-10 DIAGNOSIS — D69.6: ICD-10-CM

## 2019-06-10 DIAGNOSIS — F17.210: ICD-10-CM

## 2019-06-10 DIAGNOSIS — Z88.8: ICD-10-CM

## 2019-06-10 DIAGNOSIS — K70.10: ICD-10-CM

## 2019-06-10 DIAGNOSIS — G92: ICD-10-CM

## 2019-06-10 DIAGNOSIS — K70.30: ICD-10-CM

## 2019-06-10 DIAGNOSIS — E02: ICD-10-CM

## 2019-06-10 DIAGNOSIS — D61.818: ICD-10-CM

## 2019-06-10 DIAGNOSIS — E83.51: ICD-10-CM

## 2019-06-10 DIAGNOSIS — K21.0: Primary | ICD-10-CM

## 2019-06-10 LAB
ALBUMIN FLD-MCNC: 3.2 G/DL (ref 3.4–5)
AMYLASE SERPL-CCNC: 98 U/L (ref 25–115)
ANION GAP SERPL CALCULATED.3IONS-SCNC: 13.8 MMOL/L (ref 8–16)
APPEARANCE UR: CLEAR
AST SERPL-CCNC: 121 U/L (ref 15–37)
BARBITURATES UR QL SCN: (no result) NG/ML
BASOPHILS # BLD AUTO: 0 K/UL (ref 0–0.22)
BASOPHILS NFR BLD AUTO: 0.2 % (ref 0–2)
BENZODIAZ UR QL SCN: (no result) NG/ML
BILIRUB SERPL-MCNC: 0.6 MG/DL (ref 0–1)
BILIRUB UR QL STRIP: NEGATIVE
BUN SERPL-MCNC: 6 MG/DL (ref 7–18)
BZE UR QL SCN: (no result) NG/ML
CANNABINOIDS UR QL SCN: (no result) NG/ML
CHLORIDE SERPL-SCNC: 107 MMOL/L (ref 98–107)
CO2 SERPL-SCNC: 25.8 MMOL/L (ref 21–32)
COLOR UR: YELLOW
CREAT SERPL-MCNC: 0.7 MG/DL (ref 0.7–1.3)
EOSINOPHIL # BLD AUTO: 0 K/UL (ref 0–0.4)
EOSINOPHIL NFR BLD AUTO: 0.9 % (ref 0–4)
ERYTHROCYTE [DISTWIDTH] IN BLOOD BY AUTOMATED COUNT: 17.5 % (ref 11.6–13.7)
GFR SERPL CREATININE-BSD FRML MDRD: 165 ML/MIN (ref 90–?)
GLUCOSE SERPL-MCNC: 102 MG/DL (ref 74–106)
GLUCOSE UR STRIP-MCNC: NEGATIVE MG/DL
HCT VFR BLD AUTO: 35.3 % (ref 36–52)
HGB BLD-MCNC: 12 G/DL (ref 12–18)
HGB UR QL STRIP: NEGATIVE
LEUKOCYTE ESTERASE UR QL STRIP: NEGATIVE
LIPASE SERPL-CCNC: 334 U/L (ref 73–393)
LYMPHOCYTES # BLD AUTO: 2.4 K/UL (ref 2–11.5)
LYMPHOCYTES NFR BLD AUTO: 51.4 % (ref 20.5–51.1)
MAGNESIUM SERPL-MCNC: 1.8 MG/DL (ref 1.8–2.4)
MCH RBC QN AUTO: 33 PG (ref 27–31)
MCHC RBC AUTO-ENTMCNC: 34 G/DL (ref 33–37)
MCV RBC AUTO: 95.6 FL (ref 80–94)
MONOCYTES # BLD AUTO: 0.4 K/UL (ref 0.8–1)
MONOCYTES NFR BLD AUTO: 7.7 % (ref 1.7–9.3)
NEUTROPHILS # BLD AUTO: 1.8 K/UL (ref 1.8–7.7)
NEUTROPHILS NFR BLD AUTO: 39.8 % (ref 42.2–75.2)
NITRITE UR QL STRIP: NEGATIVE
OPIATES UR QL SCN: (no result) NG/ML
PCP UR QL SCN: (no result) NG/ML
PH UR STRIP: 6 [PH] (ref 5–9)
PHOSPHATE SERPL-MCNC: 3.3 MG/DL (ref 2.5–4.9)
PLATELET # BLD AUTO: 38 K/UL (ref 140–450)
POTASSIUM SERPL-SCNC: 3.6 MMOL/L (ref 3.5–5.1)
PROTHROMBIN TIME: 12.7 SECS (ref 10.8–13.4)
RBC # BLD AUTO: 3.69 MIL/UL (ref 4.2–6.1)
SODIUM SERPL-SCNC: 143 MMOL/L (ref 136–145)
T4 FREE SERPL-MCNC: 0.86 NG/DL (ref 0.76–1.46)
TSH SERPL DL<=0.05 MIU/L-ACNC: 4.75 UIU/ML (ref 0.34–3.74)
WBC # BLD AUTO: 4.6 K/UL (ref 4.8–10.8)

## 2019-06-10 PROCEDURE — P9035 PLATELET PHERES LEUKOREDUCED: HCPCS

## 2019-06-10 PROCEDURE — G0482 DRUG TEST DEF 15-21 CLASSES: HCPCS

## 2019-06-10 PROCEDURE — A9153 MULTI-VITAMIN NOS: HCPCS

## 2019-06-10 PROCEDURE — C9113 INJ PANTOPRAZOLE SODIUM, VIA: HCPCS

## 2019-06-10 NOTE — NUR
Patient will be admitted to care of Dr. Howe. Admited to [facility].  Will go to room 
120A. Belongings list completed. VSS at time of transport. Report to MATT Solis. Transfer of care at this time.

## 2019-06-10 NOTE — NUR
RECEIVED PT FROM ER VIA [facility] PT Austrian SPEAKER AAOX4 AMBULATORY HL ON RT AC PATENT 
ADMITING FOR  DX ABD AND GI BLEED MRSA  SWAB NARES TAKEN AND  SENT TO LAB, PT IS ORITCRISTIN [place] FLOOR CALL LIGHT WITHIN REACH   THE RESIDENT DR PALACIO IS HERE AND SEE THE PT

## 2019-06-10 NOTE — NUR
PT O2 SATURATION AT BETWEEN 90-91%. PT STATED HE WAS HAVING DIFFICULTY 
BREATHING. PLACED ON O2 VIA NASAL CANNULA AT 2L, O2 SATURATION AT 93%. 



-INCREASED O2 TO 3L VIA NASAL CANNULA, O2 SATURATION AT 97%

## 2019-06-10 NOTE — NUR
35 Y/O M BIB FAMILY WITH C/O RECTAL BLEEDING AND GENERALIZED WEAKNESS X1DAY. 
PER PT "HAD A GI BLEED TWO YEARS AGO AND THIS IS SIMILAR TO THAT." 10/10 PAIN, 
PRESSURE-LIKE PAIN. ABDOMEN TENDER TO TOUCH. PT GRIMACES WHEN ABDOMEN IS 
PALPATED. BOWEL SOUNDS PRESENT [id number]. X3 BLACK STOOL TODAY. DENIES ANY 
OTHER SOURCES OF BLEEDING. PT ADMITS TO DRINKING 4-5 BEERS AND SMOKES 3-4 
CIGARETTES DAILY. ERMD NOTIFIED. WILL CONTINUE TO MONITOR.

## 2019-06-11 VITALS — SYSTOLIC BLOOD PRESSURE: 132 MMHG | DIASTOLIC BLOOD PRESSURE: 87 MMHG

## 2019-06-11 VITALS — SYSTOLIC BLOOD PRESSURE: 107 MMHG | DIASTOLIC BLOOD PRESSURE: 65 MMHG

## 2019-06-11 VITALS — SYSTOLIC BLOOD PRESSURE: 114 MMHG | DIASTOLIC BLOOD PRESSURE: 71 MMHG

## 2019-06-11 VITALS — SYSTOLIC BLOOD PRESSURE: 114 MMHG | DIASTOLIC BLOOD PRESSURE: 74 MMHG

## 2019-06-11 VITALS — SYSTOLIC BLOOD PRESSURE: 109 MMHG | DIASTOLIC BLOOD PRESSURE: 76 MMHG

## 2019-06-11 LAB
ANION GAP SERPL CALCULATED.3IONS-SCNC: 13.9 MMOL/L (ref 8–16)
BASOPHILS # BLD AUTO: 0 K/UL (ref 0–0.22)
BASOPHILS NFR BLD AUTO: 0.6 % (ref 0–2)
BUN SERPL-MCNC: 6 MG/DL (ref 7–18)
CHLORIDE SERPL-SCNC: 108 MMOL/L (ref 98–107)
CHOLEST/HDLC SERPL: 14.5 {RATIO} (ref 1–4.5)
CO2 SERPL-SCNC: 25.6 MMOL/L (ref 21–32)
CREAT SERPL-MCNC: 0.7 MG/DL (ref 0.7–1.3)
EOSINOPHIL # BLD AUTO: 0 K/UL (ref 0–0.4)
EOSINOPHIL NFR BLD AUTO: 1.6 % (ref 0–4)
ERYTHROCYTE [DISTWIDTH] IN BLOOD BY AUTOMATED COUNT: 18.4 % (ref 11.6–13.7)
GFR SERPL CREATININE-BSD FRML MDRD: 165 ML/MIN (ref 90–?)
GLUCOSE SERPL-MCNC: 86 MG/DL (ref 74–106)
HCT VFR BLD AUTO: 34 % (ref 36–52)
HDLC SERPL-MCNC: 10 MG/DL (ref 40–60)
HGB BLD-MCNC: 11.3 G/DL (ref 12–18)
LDLC SERPL CALC-MCNC: 88 MG/DL (ref 60–100)
LYMPHOCYTES # BLD AUTO: 1.6 K/UL (ref 2–11.5)
LYMPHOCYTES NFR BLD AUTO: 50.3 % (ref 20.5–51.1)
MCH RBC QN AUTO: 32 PG (ref 27–31)
MCHC RBC AUTO-ENTMCNC: 33 G/DL (ref 33–37)
MCV RBC AUTO: 96.8 FL (ref 80–94)
MONOCYTES # BLD AUTO: 0.2 K/UL (ref 0.8–1)
MONOCYTES NFR BLD AUTO: 6.6 % (ref 1.7–9.3)
NEUTROPHILS # BLD AUTO: 1.3 K/UL (ref 1.8–7.7)
NEUTROPHILS NFR BLD AUTO: 40.9 % (ref 42.2–75.2)
PLATELET # BLD AUTO: 36 K/UL (ref 140–450)
POTASSIUM SERPL-SCNC: 3.5 MMOL/L (ref 3.5–5.1)
RBC # BLD AUTO: 3.52 MIL/UL (ref 4.2–6.1)
SODIUM SERPL-SCNC: 144 MMOL/L (ref 136–145)
TRIGL SERPL-MCNC: 237 MG/DL (ref 30–150)
WBC # BLD AUTO: 3.1 K/UL (ref 4.8–10.8)

## 2019-06-11 PROCEDURE — 0DB68ZX EXCISION OF STOMACH, VIA NATURAL OR ARTIFICIAL OPENING ENDOSCOPIC, DIAGNOSTIC: ICD-10-PCS

## 2019-06-11 RX ADMIN — DEXTROSE AND SODIUM CHLORIDE SCH MLS/HR: 5; .9 INJECTION, SOLUTION INTRAVENOUS at 07:45

## 2019-06-11 RX ADMIN — LACTULOSE SCH GM: 10 SOLUTION ORAL at 17:00

## 2019-06-11 RX ADMIN — LACTULOSE SCH GM: 10 SOLUTION ORAL at 13:33

## 2019-06-11 RX ADMIN — PANTOPRAZOLE SODIUM SCH MG: 40 INJECTION, POWDER, FOR SOLUTION INTRAVENOUS at 20:35

## 2019-06-11 NOTE — NUR
PT LYING IN BED SLEEP. RESPIRATIONS EVEN AND UNLABORED. FAMILY AT BEDSIDE. BE IN LOW 
POSITION. CALL LIGHT AT BEDSIDE.

## 2019-06-11 NOTE — NUR
PATIENT HAS BEEN SCREENED AND CATEGORIZED AS HIGH NUTRITION RISK. PATIENT WILL BE SEEN 
WITHIN 1-2 DAYS OF ADMISSION.



06/11/19-06/12/19



PABLO BARRAGAN RD

## 2019-06-11 NOTE — NUR
RECEIVED REPORT FROM NIGHT SHIFT NURSE CHANG FOR CONTINUITY OF CARE. RESPIRATIONS EVEN AND 
UNLABORED. IV INTACT AND PATENT. SAFETY MEASURES IN PLACE. BED IN LOW POSITION. CALL LIGHT 
AT BEDSIDE. WILL CONTINUE TO MONITOR.

## 2019-06-11 NOTE — NUR
PT BACK ON UNIT AFTER EGD PROCEDURE. PT IN STABLE CONDITION. FAMILY AT BEDSIDE. BED IN LOW 
POSITION. CALL LIGHT AT BEDSIDE.

## 2019-06-11 NOTE — NUR
MEDIC GIVEN AS ORDER PT AMBULATES TO THE [facility] AND ; HAS ABIG BM SEMILIQUID BLACK COLOR, PT ON 
TELMETRY SR

## 2019-06-11 NOTE — NUR
RECEIVED REPORT FROM MATT MORELAND AT PT BEDSIDE. PT IS AAOX4 UNDERSTANDS ENGLISH BUT PREFERS 
Djiboutian, ON ROOM AIR. ABLE TO MAKE NEEDS KNOWN, ABLE TO FOLLOW COMMANDS. PT ABLE TO AMBULATE 
WITH STEADY GAIT, AND SKIN IS INTACT.  PT HAS 20G IV TO RIGHT AC. DISCUSSED PLAN OF CARE 
WITH PT, PT VERBALIZED UNDERSTANDING. PT DENIES HAVING ANY PAIN. VITAL SIGNS STABLE, NO 
SIGNS OF DISTRESS NOTED. BED IN LOWEST POSITION, CALL LIGHT WITHIN REACH. WILL CONTINUE TO 
MONITOR.

## 2019-06-11 NOTE — NUR
6/11/19 RD INITIAL ASSESSMENT COMPLETED



PLEASE REFER TO NUTRITION ASSESSMENT UNDER CARE ACTIVITY FOR ESTIMATED NUTRITIONAL NEEDS. 



1. CONTINUE NPO AS MEDICALLY NECESSARY 

2. WHEN PT IS MEDICALLY STABLE TO ADVANCE DIET CONSIDER A HEPATIC DIET

3. RD PROVIDED NUTRITION EDUCATION ON SOBRIETY AND HEPATITIS. PT AND FAMILY ACCEPTED 
EDUCATION.

4. RD TO FOLLOW-UP 3-5 DAYS, MODERATE RISK 



PABLO BARRAGAN, RD

## 2019-06-12 VITALS — SYSTOLIC BLOOD PRESSURE: 120 MMHG | DIASTOLIC BLOOD PRESSURE: 76 MMHG

## 2019-06-12 VITALS — SYSTOLIC BLOOD PRESSURE: 125 MMHG | DIASTOLIC BLOOD PRESSURE: 80 MMHG

## 2019-06-12 VITALS — DIASTOLIC BLOOD PRESSURE: 95 MMHG | SYSTOLIC BLOOD PRESSURE: 156 MMHG

## 2019-06-12 VITALS — DIASTOLIC BLOOD PRESSURE: 85 MMHG | SYSTOLIC BLOOD PRESSURE: 124 MMHG

## 2019-06-12 LAB
ANION GAP SERPL CALCULATED.3IONS-SCNC: 15.7 MMOL/L (ref 8–16)
BASOPHILS # BLD AUTO: 0 K/UL (ref 0–0.22)
BASOPHILS NFR BLD AUTO: 0.6 % (ref 0–2)
BUN SERPL-MCNC: 6 MG/DL (ref 7–18)
CHLORIDE SERPL-SCNC: 105 MMOL/L (ref 98–107)
CO2 SERPL-SCNC: 23.7 MMOL/L (ref 21–32)
CREAT SERPL-MCNC: 0.8 MG/DL (ref 0.7–1.3)
EOSINOPHIL # BLD AUTO: 0 K/UL (ref 0–0.4)
EOSINOPHIL NFR BLD AUTO: 1.6 % (ref 0–4)
ERYTHROCYTE [DISTWIDTH] IN BLOOD BY AUTOMATED COUNT: 17.9 % (ref 11.6–13.7)
GFR SERPL CREATININE-BSD FRML MDRD: 141 ML/MIN (ref 90–?)
GLUCOSE SERPL-MCNC: 100 MG/DL (ref 74–106)
HCT VFR BLD AUTO: 36.2 % (ref 36–52)
HGB BLD-MCNC: 12 G/DL (ref 12–18)
LYMPHOCYTES # BLD AUTO: 1.6 K/UL (ref 2–11.5)
LYMPHOCYTES NFR BLD AUTO: 50.4 % (ref 20.5–51.1)
MAGNESIUM SERPL-MCNC: 1.6 MG/DL (ref 1.8–2.4)
MCH RBC QN AUTO: 32 PG (ref 27–31)
MCHC RBC AUTO-ENTMCNC: 33 G/DL (ref 33–37)
MCV RBC AUTO: 96.7 FL (ref 80–94)
MONOCYTES # BLD AUTO: 0.3 K/UL (ref 0.8–1)
MONOCYTES NFR BLD AUTO: 9 % (ref 1.7–9.3)
NEUTROPHILS # BLD AUTO: 1.2 K/UL (ref 1.8–7.7)
NEUTROPHILS NFR BLD AUTO: 38.4 % (ref 42.2–75.2)
PHOSPHATE SERPL-MCNC: 2.7 MG/DL (ref 2.5–4.9)
PLATELET # BLD AUTO: 42 K/UL (ref 140–450)
POTASSIUM SERPL-SCNC: 3.4 MMOL/L (ref 3.5–5.1)
RBC # BLD AUTO: 3.74 MIL/UL (ref 4.2–6.1)
SODIUM SERPL-SCNC: 141 MMOL/L (ref 136–145)
WBC # BLD AUTO: 3.1 K/UL (ref 4.8–10.8)

## 2019-06-12 RX ADMIN — LACTULOSE SCH GM: 10 SOLUTION ORAL at 16:37

## 2019-06-12 RX ADMIN — LACTULOSE SCH GM: 10 SOLUTION ORAL at 13:22

## 2019-06-12 RX ADMIN — DEXTROSE AND SODIUM CHLORIDE SCH MLS/HR: 5; .9 INJECTION, SOLUTION INTRAVENOUS at 01:36

## 2019-06-12 RX ADMIN — SODIUM CHLORIDE SCH MLS/HR: 0.9 INJECTION, SOLUTION INTRAVENOUS at 20:49

## 2019-06-12 RX ADMIN — LACTULOSE SCH GM: 10 SOLUTION ORAL at 09:07

## 2019-06-12 RX ADMIN — SODIUM CHLORIDE SCH MLS/HR: 0.9 INJECTION, SOLUTION INTRAVENOUS at 10:58

## 2019-06-12 RX ADMIN — PANTOPRAZOLE SODIUM SCH MG: 40 INJECTION, POWDER, FOR SOLUTION INTRAVENOUS at 20:49

## 2019-06-12 RX ADMIN — PANTOPRAZOLE SODIUM SCH MG: 40 INJECTION, POWDER, FOR SOLUTION INTRAVENOUS at 09:07

## 2019-06-12 RX ADMIN — RIFAXIMIN SCH MG: 550 TABLET ORAL at 20:49

## 2019-06-12 RX ADMIN — DEXTROSE AND SODIUM CHLORIDE SCH MLS/HR: 5; .9 INJECTION, SOLUTION INTRAVENOUS at 09:21

## 2019-06-12 NOTE — NUR
DR. BROWNING AT BEDSIDE. FAMILY AT BEDSIDE. DOCTOR UPDATED PATIENT AND FAMILY ON GI CARE. NO 
SIGNS OF DISTRESS ON RA.

## 2019-06-12 NOTE — NUR
RECEIVED REPORT FROM DAY SHIFT RNHENRY. PATIENT SLEEPING AND IN STABLE CONDITION. NO SIGNS 
OF DISTRESS. SAFETY PRECAUTIONS IN PLACE. WILL CONTINUE TO MONITOR.

## 2019-06-12 NOTE — NUR
Received endorsement from AM shift RN; patient A/Ox4, able to make needs known, ambulatory, 
English speaking, flat affect. Patient watching TV; introduced self, updated board. No SOB 
or distress noted, on room air. IV site on right antecubital, 20 gauge, running IVF at 
60mL/hr. Skin intact. Bed  in the lowest position ,call light within reach. Initial 
assessment done. Will continue to monitor.

## 2019-06-12 NOTE — NUR
TRANSFER PATIENT FROM [facility] TO [facility]. REMOVED TELEMETRY LEADS.

## 2019-06-12 NOTE — NUR
ADMINISTERED SCHEDULED MEDICATIONS. PATIENT TOLERATING REGULAR DIET. NO GI UPSET OTHER THAN 
DIARRHEA WHICH IS RELATED TO THE LACTULOSE. PATIENT VERBALIZED UNDERSTANDING THAT THIS IS AN 
EXPECTED EFFECT.

## 2019-06-12 NOTE — NUR
PT REFUSED SCHEDULED DOSE OF ATIVAN. RISKS AND BENEFITS OF MEDICATION EXPLAINED AND PT WAS 
DROWSY BUT VERBALIZED UNDERSTANDING. PT STILL REFUSED.

## 2019-06-12 NOTE — NUR
PT DENIES HAVING ANY PAIN. VITAL SIGNS STABLE, NO SIGNS OF DISTRESS NOTED. BED IN LOWEST 
POSITION WITH BED ALARM ON, CALL LIGHT WITHIN REACH. WILL CONTINUE TO MONITOR.

## 2019-06-12 NOTE — NUR
SPOKE TO PHARMACY REGARDING NEW ORDER OF MAG 2G IN ADDITION TO BANANA BAG WITH MAG 2G 
SCHEDULED AT 1200. PHARMACY CALLED DOCTOR. AWAITING RETURN CALL FROM DOCTOR REGARDING TOTAL 
MAG OF 4G.

## 2019-06-12 NOTE — NUR
SPOKE TO DOCTOR ABOUT ADMINISTRATION OF BANANA BAG IN ADDITION TO MAG SULFATE 2G. DOCTOR TO 
DC THE BANANA BAG.

## 2019-06-12 NOTE — NUR
PATIENT RESTING IN BED, NO SIGNS OF DISTRESS ON RA. TOLERATING REGULAR DIET, PASSING LOOSE 
STOOLS DUE TO LACTULOSE ADMINISTRATION. FLAT AFFECT, WANTS TO GO HOME. FAMILY AT BEDSIDE. 
DOCTOR AWARE.

## 2019-06-12 NOTE — NUR
Addendum to DC Plan Intervention:



Patient reported that he does not drink in front of his child and that child is under the 
care of child's mother. Patient reports not drinking around his child, nor operates any 
vehicles while under the influence. SW/CM will follow up as needed.

## 2019-06-12 NOTE — NUR
IV PUMP ALARM WAS GOING OFF, PT HAD ARM BENT. WOKE PT UP AND ASKED HIM TO KEEP ARM STRAIGHT, 
PT VERBALIZED UNDERSTANDING.

## 2019-06-13 VITALS — SYSTOLIC BLOOD PRESSURE: 132 MMHG | DIASTOLIC BLOOD PRESSURE: 72 MMHG

## 2019-06-13 VITALS — DIASTOLIC BLOOD PRESSURE: 87 MMHG | SYSTOLIC BLOOD PRESSURE: 113 MMHG

## 2019-06-13 VITALS — DIASTOLIC BLOOD PRESSURE: 71 MMHG | SYSTOLIC BLOOD PRESSURE: 119 MMHG

## 2019-06-13 LAB
ANION GAP SERPL CALCULATED.3IONS-SCNC: 16.5 MMOL/L (ref 8–16)
BASOPHILS # BLD AUTO: 0 K/UL (ref 0–0.22)
BASOPHILS NFR BLD AUTO: 0.6 % (ref 0–2)
BUN SERPL-MCNC: 5 MG/DL (ref 7–18)
CHLORIDE SERPL-SCNC: 105 MMOL/L (ref 98–107)
CO2 SERPL-SCNC: 20.9 MMOL/L (ref 21–32)
CREAT SERPL-MCNC: 0.7 MG/DL (ref 0.7–1.3)
EOSINOPHIL # BLD AUTO: 0.1 K/UL (ref 0–0.4)
EOSINOPHIL NFR BLD AUTO: 2.2 % (ref 0–4)
ERYTHROCYTE [DISTWIDTH] IN BLOOD BY AUTOMATED COUNT: 18 % (ref 11.6–13.7)
GFR SERPL CREATININE-BSD FRML MDRD: 165 ML/MIN (ref 90–?)
GLUCOSE SERPL-MCNC: 93 MG/DL (ref 74–106)
HCT VFR BLD AUTO: 35.1 % (ref 36–52)
HGB BLD-MCNC: 11.8 G/DL (ref 12–18)
LYMPHOCYTES # BLD AUTO: 1.2 K/UL (ref 2–11.5)
LYMPHOCYTES NFR BLD AUTO: 43.2 % (ref 20.5–51.1)
MAGNESIUM SERPL-MCNC: 1.7 MG/DL (ref 1.8–2.4)
MCH RBC QN AUTO: 32 PG (ref 27–31)
MCHC RBC AUTO-ENTMCNC: 34 G/DL (ref 33–37)
MCV RBC AUTO: 95.8 FL (ref 80–94)
MONOCYTES # BLD AUTO: 0.3 K/UL (ref 0.8–1)
MONOCYTES NFR BLD AUTO: 8.7 % (ref 1.7–9.3)
NEUTROPHILS # BLD AUTO: 1.3 K/UL (ref 1.8–7.7)
NEUTROPHILS NFR BLD AUTO: 45.3 % (ref 42.2–75.2)
PHOSPHATE SERPL-MCNC: 2.9 MG/DL (ref 2.5–4.9)
PLATELET # BLD AUTO: 40 K/UL (ref 140–450)
POTASSIUM SERPL-SCNC: 3.4 MMOL/L (ref 3.5–5.1)
RBC # BLD AUTO: 3.66 MIL/UL (ref 4.2–6.1)
SODIUM SERPL-SCNC: 139 MMOL/L (ref 136–145)
WBC # BLD AUTO: 2.9 K/UL (ref 4.8–10.8)

## 2019-06-13 RX ADMIN — FOLIC ACID SCH MG: 1 TABLET ORAL at 09:11

## 2019-06-13 RX ADMIN — LACTULOSE SCH GM: 10 SOLUTION ORAL at 17:16

## 2019-06-13 RX ADMIN — RIFAXIMIN SCH MG: 550 TABLET ORAL at 09:11

## 2019-06-13 RX ADMIN — RIFAXIMIN SCH MG: 550 TABLET ORAL at 20:54

## 2019-06-13 RX ADMIN — SODIUM CHLORIDE SCH MLS/HR: 0.9 INJECTION, SOLUTION INTRAVENOUS at 16:05

## 2019-06-13 RX ADMIN — SODIUM CHLORIDE SCH MLS/HR: 0.9 INJECTION, SOLUTION INTRAVENOUS at 05:23

## 2019-06-13 RX ADMIN — LACTULOSE SCH GM: 10 SOLUTION ORAL at 12:13

## 2019-06-13 RX ADMIN — LACTULOSE SCH GM: 10 SOLUTION ORAL at 09:10

## 2019-06-13 RX ADMIN — PANTOPRAZOLE SODIUM SCH MG: 40 INJECTION, POWDER, FOR SOLUTION INTRAVENOUS at 09:10

## 2019-06-13 RX ADMIN — MULTIVITAMIN TABLET SCH TAB: TABLET at 09:11

## 2019-06-13 RX ADMIN — PANTOPRAZOLE SODIUM SCH MG: 40 INJECTION, POWDER, FOR SOLUTION INTRAVENOUS at 20:54

## 2019-06-13 RX ADMIN — Medication SCH MG: at 09:10

## 2019-06-13 NOTE — NUR
PATIENT WAS SLEEPING COMFORTABLY. RESPIRATION EVEN, UNLABOR ON ROOM AIR. SKIN DRY AND WARM. 
IV PATENT AND INTACT. DENIED PAIN, N/V AT THIS TIME. PLAN OF CARE WAS DISCUSSED WITH 
PATIENT. BED AT LOW POSITION, SIDE RAILS UP. CALL LIGHT WITHIN REACH

## 2019-06-13 NOTE — NUR
PATIENT WAS AWAKE, ALERT, WATCHING TV COMFORTABLY. RESPIRATION EVEN, UNLABOR ON ROOM AIR. 
COMPLAINED OF ABDOMINAL PAIN 8/10, DR. CHA WAS MADE AWARE. NO DISTRESS NOTED AT THIS 
TIME. FAMILY AT BEDSIDE

## 2019-06-13 NOTE — NUR
RECEIVED BEDSIDE REPORT FROM LAURA LU. PT A/O X4. DISCUSSED PLAN OF CARE WITH PT. 
ABLE TO MAKE NEEDS KNOWN. VERBALIZED UNDERSTANDING. STD PRECAUTIONS. NO SIGNS OF RESP 
DISTRESS. ROOM AIR. SKIN INTACT. R AC 20G NS @50. IV SITE PATENT AND INTACT. PT ABLE TO 
AMBULATE. STEADY GAIT. BED IN LOW POSITION. CALL LIGHT WITHIN REACH. FAMILY AT BEDSIDE. WILL 
CONTINUE TO MONITOR.

## 2019-06-13 NOTE — NUR
ADMINISTERED PATIENT MEDS AS SCHEDULED. EDUCATED ON SIDE EFFECTS. VERBALIZED UNDERSTANDING. 
TOLERATED WELL. DENIES PAIN. NO COMPLAINTS AT THIS TIME. WILL CONTINUE TO MONITOR.

## 2019-06-14 VITALS — SYSTOLIC BLOOD PRESSURE: 123 MMHG | DIASTOLIC BLOOD PRESSURE: 85 MMHG

## 2019-06-14 VITALS — SYSTOLIC BLOOD PRESSURE: 121 MMHG | DIASTOLIC BLOOD PRESSURE: 77 MMHG

## 2019-06-14 LAB
ANION GAP SERPL CALCULATED.3IONS-SCNC: 13 MMOL/L (ref 8–16)
BASOPHILS # BLD AUTO: 0 K/UL (ref 0–0.22)
BASOPHILS NFR BLD AUTO: 0.4 % (ref 0–2)
BUN SERPL-MCNC: 5 MG/DL (ref 7–18)
CHLORIDE SERPL-SCNC: 106 MMOL/L (ref 98–107)
CO2 SERPL-SCNC: 22.5 MMOL/L (ref 21–32)
CREAT SERPL-MCNC: 0.8 MG/DL (ref 0.7–1.3)
EOSINOPHIL # BLD AUTO: 0.1 K/UL (ref 0–0.4)
EOSINOPHIL NFR BLD AUTO: 2.2 % (ref 0–4)
ERYTHROCYTE [DISTWIDTH] IN BLOOD BY AUTOMATED COUNT: 17.8 % (ref 11.6–13.7)
GFR SERPL CREATININE-BSD FRML MDRD: 141 ML/MIN (ref 90–?)
GLUCOSE SERPL-MCNC: 88 MG/DL (ref 74–106)
HCT VFR BLD AUTO: 35.5 % (ref 36–52)
HGB BLD-MCNC: 12 G/DL (ref 12–18)
LYMPHOCYTES # BLD AUTO: 1.6 K/UL (ref 2–11.5)
LYMPHOCYTES NFR BLD AUTO: 37.3 % (ref 20.5–51.1)
MAGNESIUM SERPL-MCNC: 1.8 MG/DL (ref 1.8–2.4)
MCH RBC QN AUTO: 33 PG (ref 27–31)
MCHC RBC AUTO-ENTMCNC: 34 G/DL (ref 33–37)
MCV RBC AUTO: 96.5 FL (ref 80–94)
MONOCYTES # BLD AUTO: 0.4 K/UL (ref 0.8–1)
MONOCYTES NFR BLD AUTO: 10.2 % (ref 1.7–9.3)
NEUTROPHILS # BLD AUTO: 2.1 K/UL (ref 1.8–7.7)
NEUTROPHILS NFR BLD AUTO: 49.9 % (ref 42.2–75.2)
PHOSPHATE SERPL-MCNC: 3.3 MG/DL (ref 2.5–4.9)
PLATELET # BLD AUTO: 40 K/UL (ref 140–450)
POTASSIUM SERPL-SCNC: 3.5 MMOL/L (ref 3.5–5.1)
RBC # BLD AUTO: 3.68 MIL/UL (ref 4.2–6.1)
SODIUM SERPL-SCNC: 138 MMOL/L (ref 136–145)
WBC # BLD AUTO: 4.2 K/UL (ref 4.8–10.8)

## 2019-06-14 RX ADMIN — LACTULOSE SCH GM: 10 SOLUTION ORAL at 08:14

## 2019-06-14 RX ADMIN — RIFAXIMIN SCH MG: 550 TABLET ORAL at 08:13

## 2019-06-14 RX ADMIN — SODIUM CHLORIDE SCH MLS/HR: 0.9 INJECTION, SOLUTION INTRAVENOUS at 02:30

## 2019-06-14 RX ADMIN — PANTOPRAZOLE SODIUM SCH MG: 40 INJECTION, POWDER, FOR SOLUTION INTRAVENOUS at 08:23

## 2019-06-14 RX ADMIN — Medication SCH MG: at 08:23

## 2019-06-14 RX ADMIN — FOLIC ACID SCH MG: 1 TABLET ORAL at 08:12

## 2019-06-14 RX ADMIN — MULTIVITAMIN TABLET SCH TAB: TABLET at 08:12

## 2019-06-14 NOTE — NUR
APPOINTMENT DATE/TIME/LOCATION AT [facility] GIVEN TO WIFE AT BEDSIDE. PT IS 
DRESSED. IN STABLE CONDITION. TO GO HOME VIA PRIVATE VEHICLE WITH FAMILY MEMBERS.

## 2019-06-14 NOTE — NUR
PT IS SLEEPING. NO SIGNS OF DISTRESS. EASILY AROUSABLE. DENIES PAIN. BED IN LOW POSITION. 
CALL LIGHT WITHIN REACH. WILL CONTINUE TO MONITOR.

## 2019-06-14 NOTE — NUR
SLEEPING. NO SIGNS OF DISTRESS. BED IN LOW POSITION. CALL LIGHT WITHIN REACH. WILL CONTINUE 
TO MONITOR.

## 2019-06-14 NOTE — NUR
VITALS TAKEN. TOLERATED WELL. VITALS WNL. NO PAIN AT THIS TIME. NO COMPLAINTS. WILL CONTINUE 
TO MONITOR.

## 2019-06-14 NOTE — NUR
RECEIVED BEDSIDE REPORT FROM NIGHT SHIFT RN. PT A/O X4, CALM, COOPERATIVE. STATES TOLERABLE 
4/10 CRAMPING ABD PAIN, DENIES NEED FOR MED AT THIS TIME. NO S/S RESP DISTRESS. VSS. SKIN 
INTACT. AMBULATORY PER NIGHT SHIFT RN. IV SITE PATENT AND ASYMPTOMATIC, INFUSING IVF PER MD 
ORDERS. BED IN LOW POSITION. CALL LIGHT WITHIN REACH.  WILL CONTINUE TO MONITOR.

## 2019-06-14 NOTE — NUR
PER. NIMO TO CALL ALONDRA TO TRY TO SET UP APPOINTMENT. PT'S WIFE CALLED EARLIER BUT NO 
ONE ANSWERED THE PHONE. PT AND WIFE ARE READY FOR D/C- WILL WAIT FOR  TO ATTEMPT TO MAKE 
APPOINTMENT

## 2019-06-14 NOTE — NUR
STATES SHE WAS ABLE TO MAKE APPT FOR PT AT [facility] AND SHE WILL GIVE 
APPT TO PATIENT. INFORMED WIFE AND PT. REMOVED IV SITE WITH MINIMAL BLOOD LOSS AND LUMEN 
COMPLETELY INTACT. ID BANDS REMOVED. PT WILL GET DRESSED AND WAIT FOR  TO GIVE THEM 
APPOINTMENT INFO.

## 2019-06-14 NOTE — NUR
PT SLEEPING IN BED EASILY AROUSABLE. EVEN CHEST RISE. NO SIGNS OF DISTRESS. WILL CONTINUE TO 
MONITOR.

## 2019-06-14 NOTE — NUR
DISCHARGE PAPERWORK, INCLUDING INSTRUCTIONS TO F/U AT [facility], GIVEN TO 
PATIENT. WIFE AT BEDSIDE TO TRANSLATE PER PT PREFERENCE. NEW PRESCRIPTION/MEDICATION 
TEACHING AND MEDICATION RECONCILIATION TEACHING GIVEN TO PATIENT. PT REFUSING PNEUMOVAX. 
RISKS/BENEFITS EXPLAINED.

## 2021-04-01 NOTE — NUR
PT CURRENTLY RESTING, NO S/S OF RESPIRATORY DISTRESS OR DISCOMFORT NOTED. FAMILY AT BEDSIDE. 
WILL CONTINUE TO MONITOR. 162.56
